# Patient Record
Sex: FEMALE | Race: OTHER | HISPANIC OR LATINO | URBAN - METROPOLITAN AREA
[De-identification: names, ages, dates, MRNs, and addresses within clinical notes are randomized per-mention and may not be internally consistent; named-entity substitution may affect disease eponyms.]

---

## 2020-07-30 ENCOUNTER — OUTPATIENT (OUTPATIENT)
Dept: OUTPATIENT SERVICES | Facility: HOSPITAL | Age: 25
LOS: 1 days | Discharge: HOME | End: 2020-07-30

## 2020-09-30 ENCOUNTER — OUTPATIENT (OUTPATIENT)
Dept: OUTPATIENT SERVICES | Facility: HOSPITAL | Age: 25
LOS: 1 days | Discharge: HOME | End: 2020-09-30

## 2021-01-08 ENCOUNTER — OUTPATIENT (OUTPATIENT)
Dept: OUTPATIENT SERVICES | Facility: HOSPITAL | Age: 26
LOS: 1 days | Discharge: HOME | End: 2021-01-08

## 2021-01-11 DIAGNOSIS — Z01.20 ENCOUNTER FOR DENTAL EXAMINATION AND CLEANING WITHOUT ABNORMAL FINDINGS: ICD-10-CM

## 2021-01-27 ENCOUNTER — OUTPATIENT (OUTPATIENT)
Dept: OUTPATIENT SERVICES | Facility: HOSPITAL | Age: 26
LOS: 1 days | Discharge: HOME | End: 2021-01-27

## 2021-01-27 DIAGNOSIS — K01.1 IMPACTED TEETH: ICD-10-CM

## 2021-05-19 ENCOUNTER — TRANSCRIPTION ENCOUNTER (OUTPATIENT)
Age: 26
End: 2021-05-19

## 2022-07-21 ENCOUNTER — NON-APPOINTMENT (OUTPATIENT)
Age: 27
End: 2022-07-21

## 2024-11-18 ENCOUNTER — ASOB RESULT (OUTPATIENT)
Age: 29
End: 2024-11-18

## 2024-11-18 ENCOUNTER — APPOINTMENT (OUTPATIENT)
Dept: ANTEPARTUM | Facility: CLINIC | Age: 29
End: 2024-11-18
Payer: MEDICAID

## 2024-11-18 ENCOUNTER — NON-APPOINTMENT (OUTPATIENT)
Age: 29
End: 2024-11-18

## 2024-11-18 ENCOUNTER — OUTPATIENT (OUTPATIENT)
Dept: OUTPATIENT SERVICES | Facility: HOSPITAL | Age: 29
LOS: 1 days | End: 2024-11-18
Payer: MEDICAID

## 2024-11-18 ENCOUNTER — RESULT CHARGE (OUTPATIENT)
Age: 29
End: 2024-11-18

## 2024-11-18 VITALS
OXYGEN SATURATION: 99 % | WEIGHT: 158 LBS | HEART RATE: 83 BPM | BODY MASS INDEX: 28 KG/M2 | HEIGHT: 63 IN | SYSTOLIC BLOOD PRESSURE: 106 MMHG | DIASTOLIC BLOOD PRESSURE: 67 MMHG

## 2024-11-18 DIAGNOSIS — Z34.90 ENCOUNTER FOR SUPERVISION OF NORMAL PREGNANCY, UNSPECIFIED, UNSPECIFIED TRIMESTER: ICD-10-CM

## 2024-11-18 DIAGNOSIS — O09.90 SUPERVISION OF HIGH RISK PREGNANCY, UNSPECIFIED, UNSPECIFIED TRIMESTER: ICD-10-CM

## 2024-11-18 DIAGNOSIS — O35.BXX0 MATERNAL CARE FOR OTHER (SUSPECTED) FETAL ABNORMALITY AND DAMAGE, FETAL CARDIAC ANOMALIES, NOT APPLICABLE OR UNSPECIFIED: ICD-10-CM

## 2024-11-18 LAB
BILIRUB UR QL STRIP: NORMAL
BP DIAS: 67 MM HG
BP SYS: 106 MM HG
CLARITY UR: CLEAR
COLLECTION METHOD: NORMAL
FETAL HEART RATE (BPM): 128
FETAL MOVEMENT: PRESENT
GLUCOSE UR-MCNC: NORMAL
HCG UR QL: 0.2 EU/DL
HGB UR QL STRIP.AUTO: NORMAL
KETONES UR-MCNC: NORMAL
LEUKOCYTE ESTERASE UR QL STRIP: NORMAL
NITRITE UR QL STRIP: NORMAL
OB COMMENTS: NORMAL
PH UR STRIP: 5
PROT UR STRIP-MCNC: NORMAL
SCHEDULED VISIT: YES
SP GR UR STRIP: 1.03
URINE ALBUMIN/PROTEIN: NORMAL
URINE GLUCOSE: NORMAL
URINE KETONES: NORMAL
WEEKS GESTATION: 36.4

## 2024-11-18 PROCEDURE — 87591 N.GONORRHOEAE DNA AMP PROB: CPT

## 2024-11-18 PROCEDURE — 76816 OB US FOLLOW-UP PER FETUS: CPT | Mod: 26

## 2024-11-18 PROCEDURE — 99213 OFFICE O/P EST LOW 20 MIN: CPT | Mod: 25

## 2024-11-18 PROCEDURE — 99203 OFFICE O/P NEW LOW 30 MIN: CPT | Mod: 25

## 2024-11-18 PROCEDURE — 81002 URINALYSIS NONAUTO W/O SCOPE: CPT

## 2024-11-18 PROCEDURE — 76819 FETAL BIOPHYS PROFIL W/O NST: CPT | Mod: 26,59

## 2024-11-18 PROCEDURE — 76816 OB US FOLLOW-UP PER FETUS: CPT

## 2024-11-18 PROCEDURE — 87491 CHLMYD TRACH DNA AMP PROBE: CPT

## 2024-11-18 PROCEDURE — 76819 FETAL BIOPHYS PROFIL W/O NST: CPT

## 2024-11-18 PROCEDURE — 87661 TRICHOMONAS VAGINALIS AMPLIF: CPT

## 2024-11-18 PROCEDURE — 87653 STREP B DNA AMP PROBE: CPT

## 2024-11-19 ENCOUNTER — OUTPATIENT (OUTPATIENT)
Dept: OUTPATIENT SERVICES | Facility: HOSPITAL | Age: 29
LOS: 1 days | End: 2024-11-19

## 2024-11-19 DIAGNOSIS — Z3A.36 36 WEEKS GESTATION OF PREGNANCY: ICD-10-CM

## 2024-11-19 DIAGNOSIS — Z34.90 ENCOUNTER FOR SUPERVISION OF NORMAL PREGNANCY, UNSPECIFIED, UNSPECIFIED TRIMESTER: ICD-10-CM

## 2024-11-19 DIAGNOSIS — Z36.9 ENCOUNTER FOR ANTENATAL SCREENING, UNSPECIFIED: ICD-10-CM

## 2024-11-19 DIAGNOSIS — O35.8XX0 MATERNAL CARE FOR OTHER (SUSPECTED) FETAL ABNORMALITY AND DAMAGE, NOT APPLICABLE OR UNSPECIFIED: ICD-10-CM

## 2024-11-20 ENCOUNTER — OUTPATIENT (OUTPATIENT)
Dept: OUTPATIENT SERVICES | Facility: HOSPITAL | Age: 29
LOS: 1 days | End: 2024-11-20
Payer: MEDICAID

## 2024-11-20 DIAGNOSIS — Z34.90 ENCOUNTER FOR SUPERVISION OF NORMAL PREGNANCY, UNSPECIFIED, UNSPECIFIED TRIMESTER: ICD-10-CM

## 2024-11-20 LAB
BASOPHILS # BLD AUTO: 0.05 K/UL
BASOPHILS NFR BLD AUTO: 0.5 %
C TRACH RRNA SPEC QL NAA+PROBE: NOT DETECTED
EOSINOPHIL # BLD AUTO: 0.14 K/UL
EOSINOPHIL NFR BLD AUTO: 1.3 %
HCT VFR BLD CALC: 35.6 %
HGB BLD-MCNC: 11.4 G/DL
IMM GRANULOCYTES NFR BLD AUTO: 1.6 %
LYMPHOCYTES # BLD AUTO: 2.26 K/UL
LYMPHOCYTES NFR BLD AUTO: 20.4 %
MAN DIFF?: NORMAL
MCHC RBC-ENTMCNC: 28.1 PG
MCHC RBC-ENTMCNC: 32 G/DL
MCV RBC AUTO: 87.7 FL
MONOCYTES # BLD AUTO: 0.55 K/UL
MONOCYTES NFR BLD AUTO: 5 %
N GONORRHOEA RRNA SPEC QL NAA+PROBE: NOT DETECTED
NEUTROPHILS # BLD AUTO: 7.88 K/UL
NEUTROPHILS NFR BLD AUTO: 71.2 %
PLATELET # BLD AUTO: 213 K/UL
PMV BLD AUTO: 0 /100 WBCS
RBC # BLD: 4.06 M/UL
RBC # FLD: 14.4 %
SOURCE AMPLIFICATION: NORMAL
T VAGINALIS RRNA SPEC QL NAA+PROBE: NOT DETECTED
WBC # FLD AUTO: 11.06 K/UL

## 2024-11-20 PROCEDURE — 86780 TREPONEMA PALLIDUM: CPT

## 2024-11-20 PROCEDURE — 86900 BLOOD TYPING SEROLOGIC ABO: CPT

## 2024-11-20 PROCEDURE — 86850 RBC ANTIBODY SCREEN: CPT

## 2024-11-20 PROCEDURE — 36415 COLL VENOUS BLD VENIPUNCTURE: CPT

## 2024-11-20 PROCEDURE — 87389 HIV-1 AG W/HIV-1&-2 AB AG IA: CPT

## 2024-11-20 PROCEDURE — 85027 COMPLETE CBC AUTOMATED: CPT

## 2024-11-21 LAB
ABO + RH PNL BLD: NORMAL
BLD GP AB SCN SERPL QL: NORMAL
GP B STREP DNA SPEC QL NAA+PROBE: NOT DETECTED
HIV1+2 AB SPEC QL IA.RAPID: NONREACTIVE
SOURCE GBS: NORMAL
T PALLIDUM AB SER QL IA: NEGATIVE

## 2024-11-22 ENCOUNTER — APPOINTMENT (OUTPATIENT)
Dept: PEDIATRIC CARDIOLOGY | Facility: CLINIC | Age: 29
End: 2024-11-22

## 2024-11-22 PROCEDURE — 99205 OFFICE O/P NEW HI 60 MIN: CPT | Mod: 25

## 2024-11-22 PROCEDURE — 76825 ECHO EXAM OF FETAL HEART: CPT

## 2024-11-22 PROCEDURE — 76827 ECHO EXAM OF FETAL HEART: CPT

## 2024-11-22 PROCEDURE — 93325 DOPPLER ECHO COLOR FLOW MAPG: CPT

## 2024-11-25 DIAGNOSIS — Z00.00 ENCOUNTER FOR GENERAL ADULT MEDICAL EXAMINATION WITHOUT ABNORMAL FINDINGS: ICD-10-CM

## 2024-11-26 ENCOUNTER — OUTPATIENT (OUTPATIENT)
Dept: OUTPATIENT SERVICES | Facility: HOSPITAL | Age: 29
LOS: 1 days | End: 2024-11-26
Payer: MEDICAID

## 2024-11-26 ENCOUNTER — ASOB RESULT (OUTPATIENT)
Age: 29
End: 2024-11-26

## 2024-11-26 ENCOUNTER — APPOINTMENT (OUTPATIENT)
Dept: ANTEPARTUM | Facility: CLINIC | Age: 29
End: 2024-11-26
Payer: MEDICAID

## 2024-11-26 DIAGNOSIS — Z00.00 ENCOUNTER FOR GENERAL ADULT MEDICAL EXAMINATION WITHOUT ABNORMAL FINDINGS: ICD-10-CM

## 2024-11-26 DIAGNOSIS — Z34.90 ENCOUNTER FOR SUPERVISION OF NORMAL PREGNANCY, UNSPECIFIED, UNSPECIFIED TRIMESTER: ICD-10-CM

## 2024-11-26 PROCEDURE — 76819 FETAL BIOPHYS PROFIL W/O NST: CPT | Mod: 26

## 2024-11-26 PROCEDURE — 76819 FETAL BIOPHYS PROFIL W/O NST: CPT

## 2024-11-27 DIAGNOSIS — Z3A.37 37 WEEKS GESTATION OF PREGNANCY: ICD-10-CM

## 2024-11-27 DIAGNOSIS — O35.8XX0 MATERNAL CARE FOR OTHER (SUSPECTED) FETAL ABNORMALITY AND DAMAGE, NOT APPLICABLE OR UNSPECIFIED: ICD-10-CM

## 2024-11-29 ENCOUNTER — APPOINTMENT (OUTPATIENT)
Dept: OBGYN | Facility: CLINIC | Age: 29
End: 2024-11-29

## 2024-11-29 ENCOUNTER — OUTPATIENT (OUTPATIENT)
Dept: OUTPATIENT SERVICES | Facility: HOSPITAL | Age: 29
LOS: 1 days | End: 2024-11-29
Payer: MEDICAID

## 2024-11-29 VITALS
WEIGHT: 160.5 LBS | DIASTOLIC BLOOD PRESSURE: 60 MMHG | SYSTOLIC BLOOD PRESSURE: 100 MMHG | HEIGHT: 63 IN | BODY MASS INDEX: 28.44 KG/M2

## 2024-11-29 DIAGNOSIS — Z34.90 ENCOUNTER FOR SUPERVISION OF NORMAL PREGNANCY, UNSPECIFIED, UNSPECIFIED TRIMESTER: ICD-10-CM

## 2024-11-29 PROCEDURE — 99213 OFFICE O/P EST LOW 20 MIN: CPT

## 2024-11-29 PROCEDURE — 99203 OFFICE O/P NEW LOW 30 MIN: CPT

## 2024-11-29 PROCEDURE — 81002 URINALYSIS NONAUTO W/O SCOPE: CPT

## 2024-12-03 ENCOUNTER — OUTPATIENT (OUTPATIENT)
Dept: OUTPATIENT SERVICES | Facility: HOSPITAL | Age: 29
LOS: 1 days | End: 2024-12-03
Payer: MEDICAID

## 2024-12-03 ENCOUNTER — INPATIENT (INPATIENT)
Facility: HOSPITAL | Age: 29
LOS: 1 days | Discharge: HOME | DRG: 566 | End: 2024-12-05
Attending: STUDENT IN AN ORGANIZED HEALTH CARE EDUCATION/TRAINING PROGRAM | Admitting: STUDENT IN AN ORGANIZED HEALTH CARE EDUCATION/TRAINING PROGRAM
Payer: MEDICAID

## 2024-12-03 ENCOUNTER — APPOINTMENT (OUTPATIENT)
Dept: ANTEPARTUM | Facility: CLINIC | Age: 29
End: 2024-12-03
Payer: MEDICAID

## 2024-12-03 ENCOUNTER — ASOB RESULT (OUTPATIENT)
Age: 29
End: 2024-12-03

## 2024-12-03 ENCOUNTER — NON-APPOINTMENT (OUTPATIENT)
Age: 29
End: 2024-12-03

## 2024-12-03 VITALS
DIASTOLIC BLOOD PRESSURE: 74 MMHG | SYSTOLIC BLOOD PRESSURE: 123 MMHG | TEMPERATURE: 98 F | RESPIRATION RATE: 18 BRPM | HEART RATE: 90 BPM

## 2024-12-03 DIAGNOSIS — Z34.90 ENCOUNTER FOR SUPERVISION OF NORMAL PREGNANCY, UNSPECIFIED, UNSPECIFIED TRIMESTER: ICD-10-CM

## 2024-12-03 DIAGNOSIS — O26.899 OTHER SPECIFIED PREGNANCY RELATED CONDITIONS, UNSPECIFIED TRIMESTER: ICD-10-CM

## 2024-12-03 DIAGNOSIS — O42.92 FULL-TERM PREMATURE RUPTURE OF MEMBRANES, UNSPECIFIED AS TO LENGTH OF TIME BETWEEN RUPTURE AND ONSET OF LABOR: ICD-10-CM

## 2024-12-03 LAB
BASOPHILS # BLD AUTO: 0.05 K/UL — SIGNIFICANT CHANGE UP (ref 0–0.2)
BASOPHILS NFR BLD AUTO: 0.4 % — SIGNIFICANT CHANGE UP (ref 0–1)
EOSINOPHIL # BLD AUTO: 0.1 K/UL — SIGNIFICANT CHANGE UP (ref 0–0.7)
EOSINOPHIL NFR BLD AUTO: 0.8 % — SIGNIFICANT CHANGE UP (ref 0–8)
HCT VFR BLD CALC: 35.6 % — LOW (ref 37–47)
HGB BLD-MCNC: 12 G/DL — SIGNIFICANT CHANGE UP (ref 12–16)
IMM GRANULOCYTES NFR BLD AUTO: 1.4 % — HIGH (ref 0.1–0.3)
LYMPHOCYTES # BLD AUTO: 17 % — LOW (ref 20.5–51.1)
LYMPHOCYTES # BLD AUTO: 2.26 K/UL — SIGNIFICANT CHANGE UP (ref 1.2–3.4)
MCHC RBC-ENTMCNC: 28.6 PG — SIGNIFICANT CHANGE UP (ref 27–31)
MCHC RBC-ENTMCNC: 33.7 G/DL — SIGNIFICANT CHANGE UP (ref 32–37)
MCV RBC AUTO: 85 FL — SIGNIFICANT CHANGE UP (ref 81–99)
MONOCYTES # BLD AUTO: 0.73 K/UL — HIGH (ref 0.1–0.6)
MONOCYTES NFR BLD AUTO: 5.5 % — SIGNIFICANT CHANGE UP (ref 1.7–9.3)
NEUTROPHILS # BLD AUTO: 9.97 K/UL — HIGH (ref 1.4–6.5)
NEUTROPHILS NFR BLD AUTO: 74.9 % — SIGNIFICANT CHANGE UP (ref 42.2–75.2)
NRBC # BLD: 0 /100 WBCS — SIGNIFICANT CHANGE UP (ref 0–0)
PLATELET # BLD AUTO: 215 K/UL — SIGNIFICANT CHANGE UP (ref 130–400)
PMV BLD: 11.9 FL — HIGH (ref 7.4–10.4)
PRENATAL SYPHILIS TEST: SIGNIFICANT CHANGE UP
RBC # BLD: 4.19 M/UL — LOW (ref 4.2–5.4)
RBC # FLD: 14.6 % — HIGH (ref 11.5–14.5)
WBC # BLD: 13.29 K/UL — HIGH (ref 4.8–10.8)
WBC # FLD AUTO: 13.29 K/UL — HIGH (ref 4.8–10.8)

## 2024-12-03 PROCEDURE — 86901 BLOOD TYPING SEROLOGIC RH(D): CPT

## 2024-12-03 PROCEDURE — 59409 OBSTETRICAL CARE: CPT | Mod: U9

## 2024-12-03 PROCEDURE — 36415 COLL VENOUS BLD VENIPUNCTURE: CPT

## 2024-12-03 PROCEDURE — 59050 FETAL MONITOR W/REPORT: CPT

## 2024-12-03 PROCEDURE — 76819 FETAL BIOPHYS PROFIL W/O NST: CPT | Mod: 26

## 2024-12-03 PROCEDURE — 86592 SYPHILIS TEST NON-TREP QUAL: CPT

## 2024-12-03 PROCEDURE — 86900 BLOOD TYPING SEROLOGIC ABO: CPT

## 2024-12-03 PROCEDURE — 86850 RBC ANTIBODY SCREEN: CPT

## 2024-12-03 PROCEDURE — 85025 COMPLETE CBC W/AUTO DIFF WBC: CPT

## 2024-12-03 RX ORDER — AMPICILLIN TRIHYDRATE 250 MG/5ML
1 SUSPENSION, RECONSTITUTED, ORAL (ML) ORAL EVERY 4 HOURS
Refills: 0 | Status: DISCONTINUED | OUTPATIENT
Start: 2024-12-03 | End: 2024-12-03

## 2024-12-03 RX ORDER — TETANUS TOXOID, REDUCED DIPHTHERIA TOXOID AND ACELLULAR PERTUSSIS VACCINE, ADSORBED 5; 2.5; 8; 8; 2.5 [IU]/.5ML; [IU]/.5ML; UG/.5ML; UG/.5ML; UG/.5ML
0.5 SUSPENSION INTRAMUSCULAR ONCE
Refills: 0 | Status: DISCONTINUED | OUTPATIENT
Start: 2024-12-03 | End: 2024-12-05

## 2024-12-03 RX ORDER — ACETAMINOPHEN 500MG 500 MG/1
975 TABLET, COATED ORAL
Refills: 0 | Status: DISCONTINUED | OUTPATIENT
Start: 2024-12-03 | End: 2024-12-05

## 2024-12-03 RX ORDER — OXYCODONE HYDROCHLORIDE 30 MG/1
5 TABLET ORAL
Refills: 0 | Status: DISCONTINUED | OUTPATIENT
Start: 2024-12-03 | End: 2024-12-05

## 2024-12-03 RX ORDER — IBUPROFEN 200 MG
600 TABLET ORAL EVERY 6 HOURS
Refills: 0 | Status: COMPLETED | OUTPATIENT
Start: 2024-12-03 | End: 2025-11-01

## 2024-12-03 RX ORDER — SODIUM CHLORIDE 9 MG/ML
3 INJECTION, SOLUTION INTRAMUSCULAR; INTRAVENOUS; SUBCUTANEOUS EVERY 8 HOURS
Refills: 0 | Status: DISCONTINUED | OUTPATIENT
Start: 2024-12-03 | End: 2024-12-05

## 2024-12-03 RX ORDER — KETOROLAC TROMETHAMINE 30 MG/ML
30 INJECTION INTRAMUSCULAR; INTRAVENOUS ONCE
Refills: 0 | Status: DISCONTINUED | OUTPATIENT
Start: 2024-12-03 | End: 2024-12-05

## 2024-12-03 RX ORDER — SIMETHICONE 125 MG
80 CAPSULE ORAL EVERY 4 HOURS
Refills: 0 | Status: DISCONTINUED | OUTPATIENT
Start: 2024-12-03 | End: 2024-12-05

## 2024-12-03 RX ORDER — WITCH HAZEL 50 G/100ML
1 SOLUTION RECTAL EVERY 4 HOURS
Refills: 0 | Status: DISCONTINUED | OUTPATIENT
Start: 2024-12-03 | End: 2024-12-05

## 2024-12-03 RX ORDER — BENZOCAINE 10 %
1 OINTMENT (GRAM) TOPICAL EVERY 6 HOURS
Refills: 0 | Status: DISCONTINUED | OUTPATIENT
Start: 2024-12-03 | End: 2024-12-05

## 2024-12-03 RX ORDER — .BETA.-CAROTENE, SODIUM ACETATE, ASCORBIC ACID, CHOLECALCIFEROL, .ALPHA.-TOCOPHEROL ACETATE, DL-, THIAMINE MONONITRATE, RIBOFLAVIN, NIACINAMIDE, PYRIDOXINE HYDROCHLORIDE, FOLIC ACID, CYANOCOBALAMIN, CALCIUM CARBONATE, FERROUS FUMARATE, ZINC OXIDE AND CUPRIC OXIDE 2000; 2000; 120; 400; 22; 1.84; 3; 20; 10; 1; 12; 200; 27; 25; 2 [IU]/1; [IU]/1; MG/1; [IU]/1; MG/1; MG/1; MG/1; MG/1; MG/1; MG/1; UG/1; MG/1; MG/1; MG/1; MG/1
1 TABLET ORAL DAILY
Refills: 0 | Status: DISCONTINUED | OUTPATIENT
Start: 2024-12-03 | End: 2024-12-05

## 2024-12-03 RX ORDER — 0.9 % SODIUM CHLORIDE 0.9 %
1000 INTRAVENOUS SOLUTION INTRAVENOUS
Refills: 0 | Status: DISCONTINUED | OUTPATIENT
Start: 2024-12-03 | End: 2024-12-03

## 2024-12-03 RX ORDER — DIPHENHYDRAMINE HCL 25 MG
25 CAPSULE ORAL EVERY 6 HOURS
Refills: 0 | Status: DISCONTINUED | OUTPATIENT
Start: 2024-12-03 | End: 2024-12-05

## 2024-12-03 RX ORDER — HYDROCORTISONE BUTYRATE 0.1 %
1 CREAM (GRAM) TOPICAL EVERY 6 HOURS
Refills: 0 | Status: DISCONTINUED | OUTPATIENT
Start: 2024-12-03 | End: 2024-12-05

## 2024-12-03 RX ORDER — DIBUCAINE 1 %
1 OINTMENT (GRAM) TOPICAL EVERY 6 HOURS
Refills: 0 | Status: DISCONTINUED | OUTPATIENT
Start: 2024-12-03 | End: 2024-12-05

## 2024-12-03 RX ORDER — AMPICILLIN TRIHYDRATE 250 MG/5ML
2 SUSPENSION, RECONSTITUTED, ORAL (ML) ORAL ONCE
Refills: 0 | Status: COMPLETED | OUTPATIENT
Start: 2024-12-03 | End: 2024-12-03

## 2024-12-03 RX ORDER — OXYCODONE HYDROCHLORIDE 30 MG/1
5 TABLET ORAL ONCE
Refills: 0 | Status: DISCONTINUED | OUTPATIENT
Start: 2024-12-03 | End: 2024-12-05

## 2024-12-03 RX ORDER — LANOLIN 72 %
1 OINTMENT (GRAM) TOPICAL EVERY 6 HOURS
Refills: 0 | Status: DISCONTINUED | OUTPATIENT
Start: 2024-12-03 | End: 2024-12-05

## 2024-12-03 RX ORDER — PRAMOXINE HYDROCHLORIDE 1 MG/ML
1 LOTION TOPICAL EVERY 4 HOURS
Refills: 0 | Status: DISCONTINUED | OUTPATIENT
Start: 2024-12-03 | End: 2024-12-05

## 2024-12-03 RX ADMIN — Medication 200 GRAM(S): at 21:15

## 2024-12-03 NOTE — OB RN DELIVERY SUMMARY - NS_SEPSISRSKCALC_OBGYN_ALL_OB_FT
EOS calculated successfully. EOS Risk Factor: 0.5/1000 live births ([facility] national incidence); GA=38w5d; Temp=99.3; ROM=3.95; GBS='Positive'; Antibiotics='No antibiotics or any antibiotics < 2 hrs prior to birth'

## 2024-12-03 NOTE — OB PROVIDER DELIVERY SUMMARY - NSPROVIDERDELIVERYNOTE_OBGYN_ALL_OB_FT
Patient was fully dilated and pushing. Fetal head was OA and restituted to ROT. The anterior and posterior shoulders delivered, followed by the remaining body atraumatically. The  was handed to the mother and RN or to the pediatric team. Delayed cord clamping was performed, and then clamped and cut. Cord blood gases collected x2. The placenta delivered intact with membranes. Pitocin was administered. Uterus massaged, fundus found to be firm. Cervix, vagina and perineum inspected, no lacerations noted.     Viable female infant delivered, APGAR 9/9         present for the delivery

## 2024-12-03 NOTE — OB RN PATIENT PROFILE - NSLDARRIVAL_OBGYN_ALL_OB_START_DATE
This a 69 year old male PMH: Pt with atypical chest pain and abnormal stress in 2012  s/p CABG in [place] 2012. Recurrent chest pain and Angina   3/2014 with cardiac cath : Patent LIMA - LAD, patent SVG - D2 , RPDA, SVG - LCX closed . Native LCX 90 % lesion and received a MARCELINA stent native LCX .  3/7/16 Cardiac cath : Patent LIMA- LAD Patent SVG - Diagonal , patent SVG - RCA: Occluded SVG - LCX; OM1 - 70 % stenosis -MARCELINA resolute 2.5x12  , New LM 70 % disease s/p MARCELINA Resolute 4.0x15 , EDP 25   8/28/19 Cardiac cath : Patent LIMA-LAD , Patebt SVG - D1 , Patent SVG - RCA, Patent Stents - OM1 / LCX EF 40 %   He presented again to cardiologist 11/8/19 with recurrent episodes of dyspnea  with heavy lifting and with exercise.   A NST was done revealing moderate to severe defect in basal inferior and inferior-lateral walls that are fixed suggestive if infarction with minimal thelma-infarct ischemia. Post stress EF 46%   EKG with ST depressions in II, III, V4- V6 and frequent PVCs   A echo11/19  also revealed Moderate AS and Mild moderate Mitral regurgitation   He was recommended to have a cardiac cath for evaluation of his coronaries   He reports compliance with medications . He denies any further episodes of chest pain  or SOB. He denies fever , chills weight gain or loss. , no black or blood in stool  He reports intermittent leg edema that subsides with elevation     Now s/p RHC #7  s/p LHC with angioseal closure  MARCELINA x2 Resolute LAUREN 2.75x12 to distal LIMA LAD and 2.5x18 to dLAD native after LIMA 80% 03-Dec-2024 20:23

## 2024-12-03 NOTE — OB PROVIDER H&P - ATTENDING COMMENTS
30yo  at 38w5d, GBS pos, fetal heart anomaly, late transfer of care, ruptured, in labor  Labor management discussed with patient and may include augmentation with mechanical and medical options. IV insertion needed for resuscitative measures. Pain management by anesthesia available as needed. Possibility of  delivery in event of persistent nonreassuring fetal status remote from delivery discussed. Risk of hemorrhage and infection also discussed. All questions answered and patient endorsed understanding.

## 2024-12-03 NOTE — OB RN PATIENT PROFILE - PROVIDER NOTIFICATION
Body Location Override (Optional - Billing Will Still Be Based On Selected Body Map Location If Applicable): left central lateral neck Declines

## 2024-12-03 NOTE — OB PROVIDER H&P - NSLASTDATEOFPRENATALVISIT_OBGYN_ALL_OB_DT
29-Nov-2024 RD consult attempted, pt not in room in HD, episode of hypotension. Pt was d/c 5 days ago and readmitted for po intolerance. Weight upon last follow up was 210lbs, current weight 155.5lbs, 54.8lb weight loss? will monitor weight status to determine accuracy, +1 L/R leg edema present also effecting weight status. Pt would benefit from neprovite daily and supplement TID to increase pro/brynn intakes. RD to follow up with pt and monitor po intakes.

## 2024-12-03 NOTE — OB PROVIDER H&P - NSHPLABSRESULTS_GEN_ALL_CORE
5/10/24  Type and Screen: O pos  Antibody screen: neg   Rubella: immune  Varicella: immune   RPR: neg  HbSAg: neg  HIV: NR  Gonorrhea: neg   Chlamydia: neg  TB/Quantiferon: neg  GBS bacteriuria    Second Trimester:   9/12/24 1hr gct 149  9/27/24 3hr gtt 78/161/112/120    Third Trimester: 11/18/24  HIV: NR  Gonorrhea: neg  Chlamydia: neg  GBS: neg    Sonogram:  34w1d: cephalic, ant placenta, MVP 6.24cm, 2417gr, EFW 64%  21w6d: breech, ant placenta, central cord insertion, EFW 450gr, 43%, CL 4.73cm  13w6d CRL 77.23mm, NT 1.91mm

## 2024-12-03 NOTE — OB PROVIDER H&P - NSHPPHYSICALEXAM_GEN_ALL_CORE
T(C): 36.9 (12-03-24 @ 20:23), Max: 36.9 (12-03-24 @ 20:23)  HR: 90 (12-03-24 @ 20:27) (90 - 90)  BP: 123/74 (12-03-24 @ 20:27) (123/74 - 123/74)  RR: 18 (12-03-24 @ 20:23) (18 - 18)    Gen: A+OX3. NAD  Abd: Soft, Nontender. Gravid.  SVE: 4/80/-2     FHR: 120/mod/+accel   TOCO: q4min      EFW by Leopolds: T(C): 36.9 (12-03-24 @ 20:23), Max: 36.9 (12-03-24 @ 20:23)  HR: 90 (12-03-24 @ 20:27) (90 - 90)  BP: 123/74 (12-03-24 @ 20:27) (123/74 - 123/74)  RR: 18 (12-03-24 @ 20:23) (18 - 18)    Gen: A+OX3. NAD  Abd: Soft, Nontender. Gravid.  SVE: 4/80/-2   Spec: pooling, nitrazine pos  BSS: cephalic, ant placenta  FHR: 120/mod/+accel   TOCO: q4min      EFW by Leopolds:

## 2024-12-03 NOTE — OB PROVIDER H&P - ASSESSMENT
30yo  at 38w5d, GBS pos, fetal heart anomaly, late transfer of care, ruptured, in labor    -Admit to L+D  -Monitor EFM and TOCO   -IVF and labs  -start amp  -Peds aware of cardiac anomaly  -Pain control PRN  -Clear liquid diet as tolerated  -Monitor vitals    D/w Dr. Richard and Dr. Sorenson

## 2024-12-03 NOTE — OB PROVIDER H&P - NS_OBGYNHISTORY_OBGYN_ALL_OB_FT
OB Hx: NSVDx2, largest 6lbs    Gyn Hx: Denies hx of ovarian cysts, uterine fibroids, STIs, abnormal pap tests

## 2024-12-03 NOTE — OB PROVIDER H&P - NSLOWPPHRISK_OBGYN_A_OB
No previous uterine incision/Bianchi Pregnancy/Less than or equal to 4 previous vaginal births/No known bleeding disorder/No history of postpartum hemorrhage/No other PPH risks indicated

## 2024-12-03 NOTE — OB PROVIDER H&P - IF RESULT GREATER THAN 35 DAYS OLD SELECT STATUS
Schedule a preoperative history and physical with your primary care provider. You will also need to schedule a lab appointment. Results should be faxed to [phone].    Stop aspirin and fish oil at least 7 days before surgery. Stop ibuprofen at least 24 hours before surgery. You may take acetaminophen (Tylenol) before surgery for pain relief if needed.     No solid food for 8 hours before your surgery. You may have clear liquids up to 2 hours before surgery (water, clear juice, clear soda, black coffee, jello).    Shower the night before and the morning of surgery according to the instructions in the folder.    N/A

## 2024-12-03 NOTE — OB PROVIDER DELIVERY SUMMARY - NSSELHIDDEN_OBGYN_ALL_OB_FT
[NS_DeliveryRN_OBGYN_ALL_OB_FT:[id number]] [NS_DeliveryRN_OBGYN_ALL_OB_FT:[id number]],[NS_DeliveryAttending1_OBGYN_ALL_OB_FT:[id number]=]

## 2024-12-03 NOTE — OB PROVIDER H&P - HISTORY OF PRESENT ILLNESS
30yo  at 38w5d by LMP (3/7/24) with KIMBERLY 24 presenting for LOF that started at 1830 with passage of clear fluid and contractions that started last night and are now occurring every 2 minutes, rated 6/10. Reports pos FM and no vaginal bleeding. GBS pos    Pregnancy complicated by:  #concern for fetal cardiac anomaly  -Abnormal cardiac views noted on routine sonogram, fetal echo performed by Dr. Lazaro showed concern for right ventricular hypertrophy with prominent RV muscles bundles, states that there should not be any issues on delivery but  should get fetal echo before discharge.  #GBS bacteriuria  #late transfer of care, has one visit at [facility], previously seen at [facility]  #multiparity

## 2024-12-04 DIAGNOSIS — O35.8XX0 MATERNAL CARE FOR OTHER (SUSPECTED) FETAL ABNORMALITY AND DAMAGE, NOT APPLICABLE OR UNSPECIFIED: ICD-10-CM

## 2024-12-04 DIAGNOSIS — Z3A.38 38 WEEKS GESTATION OF PREGNANCY: ICD-10-CM

## 2024-12-04 LAB
BASOPHILS # BLD AUTO: 0.06 K/UL — SIGNIFICANT CHANGE UP (ref 0–0.2)
BASOPHILS NFR BLD AUTO: 0.3 % — SIGNIFICANT CHANGE UP (ref 0–1)
BILIRUB UR QL STRIP: NEGATIVE
CLARITY UR: CLEAR
COLLECTION METHOD: NORMAL
EOSINOPHIL # BLD AUTO: 0.09 K/UL — SIGNIFICANT CHANGE UP (ref 0–0.7)
EOSINOPHIL NFR BLD AUTO: 0.5 % — SIGNIFICANT CHANGE UP (ref 0–8)
GLUCOSE UR-MCNC: NEGATIVE
HCG UR QL: NEGATIVE EU/DL
HCT VFR BLD CALC: 34.9 % — LOW (ref 37–47)
HGB BLD-MCNC: 11.5 G/DL — LOW (ref 12–16)
HGB UR QL STRIP.AUTO: NEGATIVE
IMM GRANULOCYTES NFR BLD AUTO: 1.2 % — HIGH (ref 0.1–0.3)
KETONES UR-MCNC: NEGATIVE
LEUKOCYTE ESTERASE UR QL STRIP: NEGATIVE
LYMPHOCYTES # BLD AUTO: 12.2 % — LOW (ref 20.5–51.1)
LYMPHOCYTES # BLD AUTO: 2.41 K/UL — SIGNIFICANT CHANGE UP (ref 1.2–3.4)
MCHC RBC-ENTMCNC: 28.5 PG — SIGNIFICANT CHANGE UP (ref 27–31)
MCHC RBC-ENTMCNC: 33 G/DL — SIGNIFICANT CHANGE UP (ref 32–37)
MCV RBC AUTO: 86.4 FL — SIGNIFICANT CHANGE UP (ref 81–99)
MONOCYTES # BLD AUTO: 1.58 K/UL — HIGH (ref 0.1–0.6)
MONOCYTES NFR BLD AUTO: 8 % — SIGNIFICANT CHANGE UP (ref 1.7–9.3)
NEUTROPHILS # BLD AUTO: 15.34 K/UL — HIGH (ref 1.4–6.5)
NEUTROPHILS NFR BLD AUTO: 77.8 % — HIGH (ref 42.2–75.2)
NITRITE UR QL STRIP: POSITIVE
NRBC # BLD: 0 /100 WBCS — SIGNIFICANT CHANGE UP (ref 0–0)
PH UR STRIP: 6.5
PLATELET # BLD AUTO: 219 K/UL — SIGNIFICANT CHANGE UP (ref 130–400)
PMV BLD: 12.1 FL — HIGH (ref 7.4–10.4)
PROT UR STRIP-MCNC: NEGATIVE
RBC # BLD: 4.04 M/UL — LOW (ref 4.2–5.4)
RBC # FLD: 14.5 % — SIGNIFICANT CHANGE UP (ref 11.5–14.5)
SP GR UR STRIP: 1.02
WBC # BLD: 19.72 K/UL — HIGH (ref 4.8–10.8)
WBC # FLD AUTO: 19.72 K/UL — HIGH (ref 4.8–10.8)

## 2024-12-04 PROCEDURE — 99231 SBSQ HOSP IP/OBS SF/LOW 25: CPT

## 2024-12-04 PROCEDURE — 99238 HOSP IP/OBS DSCHRG MGMT 30/<: CPT

## 2024-12-04 RX ORDER — IBUPROFEN 200 MG
600 TABLET ORAL EVERY 6 HOURS
Refills: 0 | Status: DISCONTINUED | OUTPATIENT
Start: 2024-12-04 | End: 2024-12-05

## 2024-12-04 RX ADMIN — SODIUM CHLORIDE 3 MILLILITER(S): 9 INJECTION, SOLUTION INTRAMUSCULAR; INTRAVENOUS; SUBCUTANEOUS at 14:39

## 2024-12-04 RX ADMIN — ACETAMINOPHEN 500MG 975 MILLIGRAM(S): 500 TABLET, COATED ORAL at 21:52

## 2024-12-04 RX ADMIN — SODIUM CHLORIDE 3 MILLILITER(S): 9 INJECTION, SOLUTION INTRAMUSCULAR; INTRAVENOUS; SUBCUTANEOUS at 21:01

## 2024-12-04 RX ADMIN — Medication 600 MILLIGRAM(S): at 06:32

## 2024-12-04 RX ADMIN — Medication 167 MILLIUNIT(S)/MIN: at 00:19

## 2024-12-04 RX ADMIN — ACETAMINOPHEN 500MG 975 MILLIGRAM(S): 500 TABLET, COATED ORAL at 09:04

## 2024-12-04 RX ADMIN — Medication 30 MILLILITER(S): at 21:25

## 2024-12-04 RX ADMIN — ACETAMINOPHEN 500MG 975 MILLIGRAM(S): 500 TABLET, COATED ORAL at 08:10

## 2024-12-04 RX ADMIN — SODIUM CHLORIDE 3 MILLILITER(S): 9 INJECTION, SOLUTION INTRAMUSCULAR; INTRAVENOUS; SUBCUTANEOUS at 06:00

## 2024-12-04 RX ADMIN — Medication 600 MILLIGRAM(S): at 11:39

## 2024-12-04 RX ADMIN — Medication 600 MILLIGRAM(S): at 12:51

## 2024-12-04 RX ADMIN — Medication 600 MILLIGRAM(S): at 06:17

## 2024-12-04 RX ADMIN — .BETA.-CAROTENE, SODIUM ACETATE, ASCORBIC ACID, CHOLECALCIFEROL, .ALPHA.-TOCOPHEROL ACETATE, DL-, THIAMINE MONONITRATE, RIBOFLAVIN, NIACINAMIDE, PYRIDOXINE HYDROCHLORIDE, FOLIC ACID, CYANOCOBALAMIN, CALCIUM CARBONATE, FERROUS FUMARATE, ZINC OXIDE AND CUPRIC OXIDE 1 TABLET(S): 2000; 2000; 120; 400; 22; 1.84; 3; 20; 10; 1; 12; 200; 27; 25; 2 TABLET ORAL at 11:39

## 2024-12-04 RX ADMIN — ACETAMINOPHEN 500MG 975 MILLIGRAM(S): 500 TABLET, COATED ORAL at 21:22

## 2024-12-04 NOTE — PROGRESS NOTE ADULT - SUBJECTIVE AND OBJECTIVE BOX
PGY1 NOTE  Chief Complaint: Postpartum    HPI: Pt doing well, pain well controlled on current regimen. No overnight events, no acute complaints.   Denies HA, dizziness, nausea/vomiting, lightheadedness, fevers, chills, palpitations CP, SOB, LE edema, heavy vaginal bleeding.   Ambulating yes  Voiding yes  Diet regular  Breastfeeding yes    PAST MEDICAL & SURGICAL HISTORY:  No pertinent past medical history  No significant past surgical history    Physical Exam  Vital Signs Last 24 Hrs  T(F): 97.8 (04 Dec 2024 08:29), Max: 99.3 (03 Dec 2024 21:28)  HR: 72 (04 Dec 2024 08:29) (71 - 95)  BP: 108/70 (04 Dec 2024 08:29) (108/70 - 137/66)  RR: 18 (04 Dec 2024 08:29) (17 - 18)    Physical exam:  General - AAOx3, NAD  Heart - S1S2, RRR  Lungs - CTA BL. Breathing unlabored. Speaking in clear complete sentences.   Abdomen:  - Soft, nontender, nondistended, BS+  - Fundus firm, nontender, below the umbilicus  Pelvis/Vagina - Normal rubra lochia  Extremities - No calf tenderness, no swelling    Labs:                        11.5   19.72 )-----------( 219      ( 04 Dec 2024 07:15 )             34.9                         12.0   13.29 )-----------( 215      ( 03 Dec 2024 21:44 )             35.6     ABO RH Interpretation: O POS (12-03-24 @ 21:44)  Antibody Screen: NEG (12-03-24 @ 21:44)    Prenatal Syphilis Test: Nonreact (12-03-24 @ 21:44)      acetaminophen     Tablet .. 975 milliGRAM(s) Oral <User Schedule>, Routine  benzocaine 20%/menthol 0.5% Spray 1 Spray(s) Topical every 6 hours, Routine PRN for Perineal discomfort  dibucaine 1% Ointment 1 Application(s) Topical every 6 hours, Routine PRN Perineal discomfort  diphenhydrAMINE 25 milliGRAM(s) Oral every 6 hours, Routine PRN Pruritus  diphtheria/tetanus/pertussis (acellular) Vaccine (Adacel) 0.5 milliLiter(s) IntraMuscular once, Routine  hydrocortisone 1% Cream 1 Application(s) Topical every 6 hours, Routine PRN Moderate Pain (4-6)  ibuprofen  Tablet. 600 milliGRAM(s) Oral every 6 hours, Routine  ketorolac   Injectable 30 milliGRAM(s) IV Push once, Routine  lanolin Ointment 1 Application(s) Topical every 6 hours, Routine PRN nipple soreness  magnesium hydroxide Suspension 30 milliLiter(s) Oral two times a day, Routine PRN Constipation  oxyCODONE    IR 5 milliGRAM(s) Oral every 3 hours, Routine PRN Moderate to Severe Pain (4-10)  oxyCODONE    IR 5 milliGRAM(s) Oral once, Routine PRN Moderate to Severe Pain (4-10)  oxytocin Infusion 167 milliUNIT(s)/Min (167 mL/Hr) IV Continuous <Continuous>, Routine  pramoxine 1%/zinc 5% Cream 1 Application(s) Topical every 4 hours, Routine PRN Moderate Pain (4-6)  prenatal multivitamin 1 Tablet(s) Oral daily, Routine  simethicone 80 milliGRAM(s) Chew every 4 hours, Routine PRN Gas  sodium chloride 0.9% lock flush 3 milliLiter(s) IV Push every 8 hours, Routine  witch hazel Pads 1 Application(s) Topical every 4 hours, Routine PRN Perineal discomfort

## 2024-12-04 NOTE — PROGRESS NOTE ADULT - ASSESSMENT
29y now P3, s/p  at 38w5d, complicated by GBS pos status with incomplete ampicillin therapy, PPD 1, recovering well    -f/u AM CBC on  and   -Monitor vitals and bleeding  -Pain management PRN  -Encourage ambulation  -PO hydration, regular diet  -Does not desire BC today  -f/u fetal echo

## 2024-12-04 NOTE — PROGRESS NOTE ADULT - ATTENDING COMMENTS
30 y/o PPD #1 s/p , doing well. Follow up AM labs. Routine postpartum care. Anticipate d/c home today, follow up in 6 weeks for postpartum visit.

## 2024-12-05 ENCOUNTER — TRANSCRIPTION ENCOUNTER (OUTPATIENT)
Age: 29
End: 2024-12-05

## 2024-12-05 VITALS
OXYGEN SATURATION: 99 % | HEART RATE: 70 BPM | DIASTOLIC BLOOD PRESSURE: 81 MMHG | TEMPERATURE: 98 F | RESPIRATION RATE: 18 BRPM | SYSTOLIC BLOOD PRESSURE: 116 MMHG

## 2024-12-05 LAB
BASOPHILS # BLD AUTO: 0.09 K/UL — SIGNIFICANT CHANGE UP (ref 0–0.2)
BASOPHILS NFR BLD AUTO: 0.7 % — SIGNIFICANT CHANGE UP (ref 0–1)
EOSINOPHIL # BLD AUTO: 0.15 K/UL — SIGNIFICANT CHANGE UP (ref 0–0.7)
EOSINOPHIL NFR BLD AUTO: 1.2 % — SIGNIFICANT CHANGE UP (ref 0–8)
HCT VFR BLD CALC: 35.4 % — LOW (ref 37–47)
HGB BLD-MCNC: 11.5 G/DL — LOW (ref 12–16)
IMM GRANULOCYTES NFR BLD AUTO: 2.9 % — HIGH (ref 0.1–0.3)
LYMPHOCYTES # BLD AUTO: 23.9 % — SIGNIFICANT CHANGE UP (ref 20.5–51.1)
LYMPHOCYTES # BLD AUTO: 3.09 K/UL — SIGNIFICANT CHANGE UP (ref 1.2–3.4)
MCHC RBC-ENTMCNC: 28.3 PG — SIGNIFICANT CHANGE UP (ref 27–31)
MCHC RBC-ENTMCNC: 32.5 G/DL — SIGNIFICANT CHANGE UP (ref 32–37)
MCV RBC AUTO: 87 FL — SIGNIFICANT CHANGE UP (ref 81–99)
MONOCYTES # BLD AUTO: 0.74 K/UL — HIGH (ref 0.1–0.6)
MONOCYTES NFR BLD AUTO: 5.7 % — SIGNIFICANT CHANGE UP (ref 1.7–9.3)
NEUTROPHILS # BLD AUTO: 8.48 K/UL — HIGH (ref 1.4–6.5)
NEUTROPHILS NFR BLD AUTO: 65.6 % — SIGNIFICANT CHANGE UP (ref 42.2–75.2)
NRBC # BLD: 0 /100 WBCS — SIGNIFICANT CHANGE UP (ref 0–0)
PLATELET # BLD AUTO: 203 K/UL — SIGNIFICANT CHANGE UP (ref 130–400)
PMV BLD: 11.5 FL — HIGH (ref 7.4–10.4)
RBC # BLD: 4.07 M/UL — LOW (ref 4.2–5.4)
RBC # FLD: 14.8 % — HIGH (ref 11.5–14.5)
WBC # BLD: 12.93 K/UL — HIGH (ref 4.8–10.8)
WBC # FLD AUTO: 12.93 K/UL — HIGH (ref 4.8–10.8)

## 2024-12-05 PROCEDURE — 99238 HOSP IP/OBS DSCHRG MGMT 30/<: CPT

## 2024-12-05 RX ORDER — .BETA.-CAROTENE, SODIUM ACETATE, ASCORBIC ACID, CHOLECALCIFEROL, .ALPHA.-TOCOPHEROL ACETATE, DL-, THIAMINE MONONITRATE, RIBOFLAVIN, NIACINAMIDE, PYRIDOXINE HYDROCHLORIDE, FOLIC ACID, CYANOCOBALAMIN, CALCIUM CARBONATE, FERROUS FUMARATE, ZINC OXIDE AND CUPRIC OXIDE 2000; 2000; 120; 400; 22; 1.84; 3; 20; 10; 1; 12; 200; 27; 25; 2 [IU]/1; [IU]/1; MG/1; [IU]/1; MG/1; MG/1; MG/1; MG/1; MG/1; MG/1; UG/1; MG/1; MG/1; MG/1; MG/1
1 TABLET ORAL
Qty: 0 | Refills: 0 | DISCHARGE
Start: 2024-12-05

## 2024-12-05 RX ORDER — IBUPROFEN 200 MG
1 TABLET ORAL
Qty: 0 | Refills: 0 | DISCHARGE
Start: 2024-12-05

## 2024-12-05 RX ORDER — WITCH HAZEL 50 G/100ML
1 SOLUTION RECTAL
Qty: 0 | Refills: 0 | DISCHARGE
Start: 2024-12-05

## 2024-12-05 RX ORDER — ACETAMINOPHEN 500MG 500 MG/1
3 TABLET, COATED ORAL
Qty: 0 | Refills: 0 | DISCHARGE
Start: 2024-12-05

## 2024-12-05 RX ORDER — BENZOCAINE 10 %
1 OINTMENT (GRAM) TOPICAL
Qty: 0 | Refills: 0 | DISCHARGE
Start: 2024-12-05

## 2024-12-05 RX ORDER — SIMETHICONE 125 MG
1 CAPSULE ORAL
Qty: 0 | Refills: 0 | DISCHARGE
Start: 2024-12-05

## 2024-12-05 RX ADMIN — SODIUM CHLORIDE 3 MILLILITER(S): 9 INJECTION, SOLUTION INTRAMUSCULAR; INTRAVENOUS; SUBCUTANEOUS at 05:59

## 2024-12-05 RX ADMIN — .BETA.-CAROTENE, SODIUM ACETATE, ASCORBIC ACID, CHOLECALCIFEROL, .ALPHA.-TOCOPHEROL ACETATE, DL-, THIAMINE MONONITRATE, RIBOFLAVIN, NIACINAMIDE, PYRIDOXINE HYDROCHLORIDE, FOLIC ACID, CYANOCOBALAMIN, CALCIUM CARBONATE, FERROUS FUMARATE, ZINC OXIDE AND CUPRIC OXIDE 1 TABLET(S): 2000; 2000; 120; 400; 22; 1.84; 3; 20; 10; 1; 12; 200; 27; 25; 2 TABLET ORAL at 11:43

## 2024-12-05 NOTE — DISCHARGE NOTE OB - IF BREASTFEEDING, HAVE A GLASS OF WATER, JUICE, OR LOW-FAT MILK EACH TIME YOU FEED YOUR BABY
[Normal Appearance] : normal in appearance [No Masses] : no palpable masses [No Axillary Lymphadenopathy] : no axillary lymphadenopathy [Normal] : soft, non-tender, non-distended, no masses palpated, no HSM and normal bowel sounds [No Rash] : no rash Statement Selected

## 2024-12-05 NOTE — PROGRESS NOTE ADULT - ASSESSMENT
29y now P3, s/p  at 38w5d, complicated by GBS pos status with incomplete ampicillin therapy, PPD 2, recovering well    -f/u AM CBC on   -Monitor vitals and bleeding  -Pain management PRN  -Encourage ambulation  -PO hydration, regular diet  -Does not desire BC today  -Plan to DC home today if cleared with 6wk PP visit 29y now P3, s/p  at 38w5d, complicated by GBS pos status with incomplete ampicillin therapy, PPD 2, recovering well    -f/u AM CBC on   -Monitor vitals and bleeding  -Pain management PRN  -Encourage ambulation  -PO hydration, regular diet  -Does not desire BC today  -Plan to DC home today

## 2024-12-05 NOTE — DISCHARGE NOTE OB - PATIENT PORTAL LINK FT
You can access the FollowMyHealth Patient Portal offered by [facility] by registering at the following website: [email or web address]. By joining [facility]’s FollowMyHealth portal, you will also be able to view your health information using other applications (apps) compatible with our system.

## 2024-12-05 NOTE — DISCHARGE NOTE OB - MEDICATION SUMMARY - MEDICATIONS TO TAKE
I will START or STAY ON the medications listed below when I get home from the hospital:    ibuprofen 600 mg oral tablet  -- 1 tab(s) by mouth every 6 hours  -- Indication: For pain    acetaminophen 325 mg oral tablet  -- 3 tab(s) by mouth every 6 hours  -- Indication: For pain    benzocaine 20% topical spray  -- 1 Apply on skin to affected area every 6 hours As needed for Perineal discomfort  -- Indication: For perineal pain    witch hazel 50% topical pad  -- 1 Apply on skin to affected area every 4 hours As needed Perineal discomfort  -- Indication: For perineal pain    Prenatal Multivitamins with Folic Acid 1 mg oral tablet  -- 1 tab(s) by mouth once a day  -- Indication: For postpartum    simethicone 80 mg oral tablet, chewable  -- 1 tab(s) by mouth every 4 hours As needed Gas  -- Indication: For gas

## 2024-12-05 NOTE — DISCHARGE NOTE OB - PLAN OF CARE
Uncomplicated vaginal delivery, uncomplicated postpartum course, discharged home in stable condition.   Follow up with your regular OBGYN in 6 weeks.   Nothing in the vagina for 6 weeks, no tampons, douching, intercourse.   You can take alternating doses of tylenol and ibuprofen every 3 hours for pain control

## 2024-12-05 NOTE — DISCHARGE NOTE OB - HOSPITAL COURSE
P3 now s/p , course complicated by incomplete ampicillin treatment for GBS positive status and possible fetal cardiac anomaly. Recovering well on routine PP care. Breastfeeding. Stable. Ready to DC home.

## 2024-12-05 NOTE — DISCHARGE NOTE OB - FINANCIAL ASSISTANCE
[facility] provides services at a reduced cost to those who are determined to be eligible through [facility]’s financial assistance program. Information regarding [facility]’s financial assistance program can be found by going to [email or web address] or by calling [phone].

## 2024-12-05 NOTE — PROGRESS NOTE ADULT - SUBJECTIVE AND OBJECTIVE BOX
PGY1 NOTE  Chief Complaint: Postpartum    HPI: Pt doing well, pain well controlled on current regimen. No overnight events, no acute complaints. Pt found sitting up and breastfeeding baby. Denies HA, dizziness, nausea/vomiting, lightheadedness, fevers, chills, palpitations CP, SOB, LE edema, heavy vaginal bleeding.   Ambulating yes  Voiding yes  Diet regular  Breastfeeding yes    PAST MEDICAL & SURGICAL HISTORY:  No pertinent past medical history  No significant past surgical history    Physical Exam  Vital Signs Last 24 Hrs  T(F): 98.3 (04 Dec 2024 23:00), Max: 98.3 (04 Dec 2024 23:00)  HR: 84 (04 Dec 2024 23:00) (72 - 84)  BP: 109/70 (04 Dec 2024 23:00) (108/70 - 115/73)  RR: 18 (04 Dec 2024 23:00) (18 - 18)    Physical exam:  General - AAOx3, NAD  Heart - S1S2, RRR  Lungs - CTA BL. Breathing unlabored. Speaking in clear complete sentences.   Abdomen:  - Soft, nontender, nondistended, BS+  - Fundus firm, nontender, below the umbilicus  Pelvis/Vagina - Normal rubra lochia  Extremities - No calf tenderness, no swelling    Labs:                        11.5   19.72 )-----------( 219      ( 04 Dec 2024 07:15 )             34.9                         12.0   13.29 )-----------( 215      ( 03 Dec 2024 21:44 )             35.6     ABO RH Interpretation: O POS (12-03-24 @ 21:44)  Antibody Screen: NEG (12-03-24 @ 21:44)        acetaminophen     Tablet .. 975 milliGRAM(s) Oral <User Schedule>, Routine  benzocaine 20%/menthol 0.5% Spray 1 Spray(s) Topical every 6 hours, Routine PRN for Perineal discomfort  dibucaine 1% Ointment 1 Application(s) Topical every 6 hours, Routine PRN Perineal discomfort  diphenhydrAMINE 25 milliGRAM(s) Oral every 6 hours, Routine PRN Pruritus  diphtheria/tetanus/pertussis (acellular) Vaccine (Adacel) 0.5 milliLiter(s) IntraMuscular once, Routine  hydrocortisone 1% Cream 1 Application(s) Topical every 6 hours, Routine PRN Moderate Pain (4-6)  ibuprofen  Tablet. 600 milliGRAM(s) Oral every 6 hours, Routine  ketorolac   Injectable 30 milliGRAM(s) IV Push once, Routine  lanolin Ointment 1 Application(s) Topical every 6 hours, Routine PRN nipple soreness  magnesium hydroxide Suspension 30 milliLiter(s) Oral two times a day, Routine PRN Constipation  oxyCODONE    IR 5 milliGRAM(s) Oral every 3 hours, Routine PRN Moderate to Severe Pain (4-10)  oxyCODONE    IR 5 milliGRAM(s) Oral once, Routine PRN Moderate to Severe Pain (4-10)  oxytocin Infusion 167 milliUNIT(s)/Min (167 mL/Hr) IV Continuous <Continuous>, Routine  pramoxine 1%/zinc 5% Cream 1 Application(s) Topical every 4 hours, Routine PRN Moderate Pain (4-6)  prenatal multivitamin 1 Tablet(s) Oral daily, Routine  simethicone 80 milliGRAM(s) Chew every 4 hours, Routine PRN Gas  sodium chloride 0.9% lock flush 3 milliLiter(s) IV Push every 8 hours, Routine  witch hazel Pads 1 Application(s) Topical every 4 hours, Routine PRN Perineal discomfort

## 2024-12-05 NOTE — DISCHARGE NOTE OB - CARE PROVIDER_API CALL
Gideon Pang  Obstetrics and Gynecology  [address] [place] [id number]  Phone: [phone]  Fax: [phone]  Follow Up Time:

## 2024-12-05 NOTE — DISCHARGE NOTE OB - CARE PLAN
1 Principal Discharge DX:	Vaginal delivery  Assessment and plan of treatment:	Uncomplicated vaginal delivery, uncomplicated postpartum course, discharged home in stable condition.   Follow up with your regular OBGYN in 6 weeks.   Nothing in the vagina for 6 weeks, no tampons, douching, intercourse.   You can take alternating doses of tylenol and ibuprofen every 3 hours for pain control

## 2024-12-06 ENCOUNTER — APPOINTMENT (OUTPATIENT)
Dept: OBGYN | Facility: CLINIC | Age: 29
End: 2024-12-06

## 2024-12-06 DIAGNOSIS — Z34.90 ENCOUNTER FOR SUPERVISION OF NORMAL PREGNANCY, UNSPECIFIED, UNSPECIFIED TRIMESTER: ICD-10-CM

## 2024-12-10 ENCOUNTER — APPOINTMENT (OUTPATIENT)
Dept: ANTEPARTUM | Facility: CLINIC | Age: 29
End: 2024-12-10

## 2024-12-11 ENCOUNTER — NON-APPOINTMENT (OUTPATIENT)
Age: 29
End: 2024-12-11

## 2024-12-12 ENCOUNTER — NON-APPOINTMENT (OUTPATIENT)
Age: 29
End: 2024-12-12

## 2024-12-12 DIAGNOSIS — O35.BXX0 MATERNAL CARE FOR OTHER (SUSPECTED) FETAL ABNORMALITY AND DAMAGE, FETAL CARDIAC ANOMALIES, NOT APPLICABLE OR UNSPECIFIED: ICD-10-CM

## 2024-12-12 DIAGNOSIS — Z28.21 IMMUNIZATION NOT CARRIED OUT BECAUSE OF PATIENT REFUSAL: ICD-10-CM

## 2024-12-12 DIAGNOSIS — Z3A.38 38 WEEKS GESTATION OF PREGNANCY: ICD-10-CM

## 2024-12-17 ENCOUNTER — NON-APPOINTMENT (OUTPATIENT)
Age: 29
End: 2024-12-17

## 2024-12-17 ENCOUNTER — APPOINTMENT (OUTPATIENT)
Dept: ANTEPARTUM | Facility: CLINIC | Age: 29
End: 2024-12-17

## 2024-12-17 PROBLEM — Z78.9 OTHER SPECIFIED HEALTH STATUS: Chronic | Status: ACTIVE | Noted: 2024-12-03

## 2025-01-24 ENCOUNTER — APPOINTMENT (OUTPATIENT)
Dept: OBGYN | Facility: CLINIC | Age: 30
End: 2025-01-24

## 2025-03-06 NOTE — OB RN TRIAGE NOTE - NS_PARA_OBGYN_ALL_OB_NU
For information on Fall & Injury Prevention, visit: [email or web address] OR  [email or web address] OR  https://www.cdc.gov/steadi/patient.html
2

## 2025-06-17 ENCOUNTER — NON-APPOINTMENT (OUTPATIENT)
Age: 30
End: 2025-06-17

## 2025-07-29 NOTE — DISCHARGE NOTE OB - EAT A WELL BALANCED DIET INCLUDING PROTEINS (LEAN MEATS, POULTRY, FISH AND BEANS), FRESH FRUIT OR JUICE, FRESH VEGETABLES, AND DAIRY PRODUCTS
Anesthesia Post-op Note    Patient: Tasha Peterson  Procedure(s) Performed: URETEROSCOPY, USING HOLMIUM LASER (Right: Ureter)  CYSTOSCOPY,WITH URETERAL STENT REPLACEMENT / EXCHANGE (Right: Ureter)  Anesthesia type: General    Vitals Value Taken Time   Temp 36.3 °C (97.3 °F) 07/29/25 1310   Pulse 83 07/29/25 1310   Resp 18 07/29/25 1310   SpO2 96 % 07/29/25 1310   /60 07/29/25 1310         Patient Location: PACU Phase 1  Post-op Vital Signs:stable  Level of Consciousness: awake and alert  Respiratory Status: spontaneous ventilation  Cardiovascular stable  Hydration: euvolemic  Pain Management: adequately controlled  Handoff: Handoff to receiving clinician was performed and questions were answered  Vomiting: none  Nausea: None  Airway Patency:patent  Post-op Assessment: no complications and patient tolerated procedure well      No notable events documented.                       Statement Selected